# Patient Record
(demographics unavailable — no encounter records)

---

## 2024-12-10 NOTE — REASON FOR VISIT
[Consultation] : a consultation visit [Patient] : patient [Mother] : mother [Other: ______] : provided by RALPH [Medical Records] : medical records [Interpreters_IDNumber] : [id number] [Interpreters_FullName] : Tania [FreeTextEntry3] : Mandarin

## 2024-12-10 NOTE — REVIEW OF SYSTEMS
[Negative] : Skin [Fever] : no fever [Oral Ulcer] : no oral ulcer [Asthma] : no asthma [Pneumonia] : no pneumonia [Murmur] : no murmur [Joint Pain] : no joint pain [History of UTI] : no history of urinary tract infection [Rash] : no rash [FreeTextEntry3] : no inflammation/pain  [FreeTextEntry9] : no back/leg pain/tingling/numbness/weakness  [FreeTextEntry8] : menarche 5/2024, regular menses 5 maxis/d x 4d at end of each month; no current urinary issues  [de-identified] : no difficulty ambulating

## 2024-12-10 NOTE — REVIEW OF SYSTEMS
[Negative] : Skin [Fever] : no fever [Oral Ulcer] : no oral ulcer [Asthma] : no asthma [Pneumonia] : no pneumonia [Murmur] : no murmur [Joint Pain] : no joint pain [History of UTI] : no history of urinary tract infection [Rash] : no rash [FreeTextEntry3] : no inflammation/pain  [FreeTextEntry9] : no back/leg pain/tingling/numbness/weakness  [FreeTextEntry8] : menarche 5/2024, regular menses 5 maxis/d x 4d at end of each month; no current urinary issues  [de-identified] : no difficulty ambulating

## 2024-12-10 NOTE — HISTORY OF PRESENT ILLNESS
[de-identified] : Belkis (pronounced "Lori") is an 11 year old F with chronic mild anemia since birth (normal iron profile), and FHx anemia (mother with thalassemia, no transfusion needed; brother with anemia, resolved), who is referred by Dr Archer (PMD) in consultation for a positive stool calprotectin.  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   RECENT PMD LABS: - 8/22/23 Hbg 11.6 (normal per ref range), MCV 74, plt 477; normal Hgb electrophoresis, albumin 5.2 - 10/8/23 Hgb 11.2 (normal per ref range), MCV 77, plt 479 - 8/7/24 Hgb 11.2 (anemic for age), MCV 78, plt 403, retic 2.2%, albumin 4.6, normal iron panel - 8/26/24 positive calpro 445, FOBT neg  Belkis used to have a poor appetite but is now eating better. Gaining weight, per growth curve. There is no report of nausea, reflux, vomiting, or abdominal pain.  Belkis stools once every 1-2 days, [place] 3, sometimes with straining. There is sometimes a small amount of blood.  There is no rectal pain, mucus, steatorrhea, fecal accidents, nocturnal BMs or diarrhea.  He is not gassy nor distended.  Has not tried laxatives.   Belkis denies having fever, eye pain, mouth sores, joint pain or rash.  There is no family history of IBD or autoimmune diseases.

## 2024-12-10 NOTE — CONSULT LETTER
[Dear  ___] : Dear  [unfilled], [Consult Letter:] : I had the pleasure of evaluating your patient, [unfilled]. [Please see my note below.] : Please see my note below. [Consult Closing:] : Thank you very much for allowing me to participate in the care of this patient.  If you have any questions, please do not hesitate to contact me. [Sincerely,] : Sincerely, [FreeTextEntry3] : Yaritza Evans MD [facility]

## 2024-12-10 NOTE — PHYSICAL EXAM
[Well Developed] : well developed [Well Nourished] : well nourished [NAD] : in no acute distress [Alert and Active] : alert and active [Moist & Pink Mucous Membranes] : moist and pink mucous membranes [Normal Oropharynx] : the oropharynx was normal [CTAB] : lungs clear to auscultation bilaterally [Regular Rate and Rhythm] : regular rate and rhythm [Normal S1, S2] : normal S1 and S2 [Soft] : soft  [Normal Bowel Sounds] : normal bowel sounds [No HSM] : no hepatosplenomegaly appreciated [No Back Lesion] : no back lesion [Normal Position] : normal position [Rectal Exam Deferred] : rectal exam was deferred [Verbal] : verbal [Well-Perfused] : well-perfused [Interactive] : interactive [Timmy Stage ___] : Timmy stage [unfilled] [icteric] : anicteric [Oral Ulcers] : no oral ulcers [Respiratory Distress] : no respiratory distress  [Distended] : non distended [Tender] : non tender [Tags] : no skin tags  [Fissure] : no anal fissures  [Hemorrhoids] : no hemorrhoids [Joint Swelling] : no joint swelling [Cyanosis] : no cyanosis [Rash] : no rash [Jaundice] : no jaundice

## 2024-12-10 NOTE — SOCIAL HISTORY
[Parent(s)] : parent(s) [Grandparent(s)] : grandparent(s) [Brother] : brother [Grade:  _____] : Grade: [unfilled] [de-identified] : uncle

## 2024-12-10 NOTE — REASON FOR VISIT
[Consultation] : a consultation visit [Patient] : patient [Mother] : mother [Other: ______] : provided by RALPH [Medical Records] : medical records [Interpreters_IDNumber] : [id number] [Interpreters_FullName] : Tania [FreeTextEntry3] : Mandarin

## 2024-12-10 NOTE — HISTORY OF PRESENT ILLNESS
[de-identified] : Belkis (pronounced "Lori") is an 11 year old F with chronic mild anemia since birth (normal iron profile), and FHx anemia (mother with thalassemia, no transfusion needed; brother with anemia, resolved), who is referred by Dr Archer (PMD) in consultation for a positive stool calprotectin.  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   RECENT PMD LABS: - 8/22/23 Hbg 11.6 (normal per ref range), MCV 74, plt 477; normal Hgb electrophoresis, albumin 5.2 - 10/8/23 Hgb 11.2 (normal per ref range), MCV 77, plt 479 - 8/7/24 Hgb 11.2 (anemic for age), MCV 78, plt 403, retic 2.2%, albumin 4.6, normal iron panel - 8/26/24 positive calpro 445, FOBT neg  Belkis used to have a poor appetite but is now eating better. Gaining weight, per growth curve. There is no report of nausea, reflux, vomiting, or abdominal pain.  Belkis stools once every 1-2 days, [place] 3, sometimes with straining. There is sometimes a small amount of blood.  There is no rectal pain, mucus, steatorrhea, fecal accidents, nocturnal BMs or diarrhea.  He is not gassy nor distended.  Has not tried laxatives.   Belkis denies having fever, eye pain, mouth sores, joint pain or rash.  There is no family history of IBD or autoimmune diseases.

## 2024-12-10 NOTE — CONSULT LETTER
[Dear  ___] : Dear  [unfilled], [Consult Letter:] : I had the pleasure of evaluating your patient, [unfilled]. [Please see my note below.] : Please see my note below. [Consult Closing:] : Thank you very much for allowing me to participate in the care of this patient.  If you have any questions, please do not hesitate to contact me. [Sincerely,] : Sincerely, [FreeTextEntry3] : Yaritza Evans MD [facility]

## 2024-12-10 NOTE — SOCIAL HISTORY
[Parent(s)] : parent(s) [Grandparent(s)] : grandparent(s) [Brother] : brother [Grade:  _____] : Grade: [unfilled] [de-identified] : uncle

## 2024-12-10 NOTE — ASSESSMENT
[Educated Patient & Family about Diagnosis] : educated the patient and family about the diagnosis [Discussed with Family to Call in ____ week(s) for Test Results] : discussed with family to call in [unfilled] week(s) to obtain test results and with update on child's condition.  Family should call sooner if clinically indicated. [FreeTextEntry1] : ASSESSMENT: Chronic intermittent microcytic anemia with normal iron panel; FHx anemia - currently mildly anemic with an elevated calprotectin, concerning for IBD.  Neg FOBT. No symptoms of IBD.  PLAN: -  Labs. -  Repeat calpro and FOBT. -  Discussed potential of upper and lower endoscopy (including risks vs benefits) if calpro still positive or anemia persists. However, mother prefers not to scope if calpro neg even if anemia persists. Urine cup given, to bring sample on day of procedure.  -  Will discuss follow up after review results. Family to call if problems.  All questions answered.   ADDENDUM: Dec 10, 2024 - Hgb 10.9, MCV 80, plt 427.  Normal ESR, CRP, albumin 4.6.  Requested [place] [facility] to call family with result and remind them to send stool studies.

## 2024-12-10 NOTE — FAMILY HISTORY
[Celiac Disease] : no celiac disease [Inflammatory Bowel Disease] : no inflammatory bowel disease [de-identified] : no autoimmune diseases

## 2024-12-10 NOTE — FAMILY HISTORY
[Celiac Disease] : no celiac disease [Inflammatory Bowel Disease] : no inflammatory bowel disease [de-identified] : no autoimmune diseases

## 2025-03-22 NOTE — REVIEW OF SYSTEMS
[Negative] : Skin [Oral Ulcer] : no oral ulcer [Asthma] : no asthma [Pneumonia] : no pneumonia [Murmur] : no murmur [Joint Pain] : no joint pain [History of UTI] : no history of urinary tract infection [Rash] : no rash [FreeTextEntry2] : sick last week x 1d [FreeTextEntry3] : no inflammation/pain  [FreeTextEntry9] : no back/leg pain/tingling/numbness/weakness  [FreeTextEntry8] : menarche 5/2024, regular menses 5 maxis/d x 4d at end of each month; no current urinary issues  [de-identified] : no difficulty ambulating

## 2025-03-22 NOTE — REVIEW OF SYSTEMS
[Negative] : Skin [Oral Ulcer] : no oral ulcer [Asthma] : no asthma [Pneumonia] : no pneumonia [Murmur] : no murmur [Joint Pain] : no joint pain [History of UTI] : no history of urinary tract infection [Rash] : no rash [FreeTextEntry2] : sick last week x 1d [FreeTextEntry3] : no inflammation/pain  [FreeTextEntry9] : no back/leg pain/tingling/numbness/weakness  [FreeTextEntry8] : menarche 5/2024, regular menses 5 maxis/d x 4d at end of each month; no current urinary issues  [de-identified] : no difficulty ambulating

## 2025-03-22 NOTE — SOCIAL HISTORY
[Parent(s)] : parent(s) [Grandparent(s)] : grandparent(s) [Brother] : brother [Grade:  _____] : Grade: [unfilled] [de-identified] : uncle

## 2025-03-22 NOTE — REASON FOR VISIT
[Consultation Follow Up] : a consultation follow up  [Patient] : patient [Mother] : mother [Other: ______] : provided by RALPH [Time Spent: ____ minutes] : Total time spent using  services: [unfilled] minutes. The patient's primary language is not English thus required  services. [Interpreters_IDNumber] : [id number] [Interpreters_FullName] : Lizette [FreeTextEntry3] : Mandarin

## 2025-03-22 NOTE — CONSULT LETTER
[Dear  ___] : Dear  [unfilled], [Consult Letter:] : I had the pleasure of evaluating your patient, [unfilled]. [Please see my note below.] : Please see my note below. [Consult Closing:] : Thank you very much for allowing me to participate in the care of this patient.  If you have any questions, please do not hesitate to contact me. [Sincerely,] : Sincerely, [FreeTextEntry3] : Yaritza Evans MD [facility]

## 2025-03-22 NOTE — SOCIAL HISTORY
[Parent(s)] : parent(s) [Grandparent(s)] : grandparent(s) [Brother] : brother [Grade:  _____] : Grade: [unfilled] [de-identified] : uncle

## 2025-03-22 NOTE — ASSESSMENT
[Discussed with Family to Call in ____ week(s) for Test Results] : discussed with family to call in [unfilled] week(s) to obtain test results and with update on child's condition.  Family should call sooner if clinically indicated. [FreeTextEntry1] : ASSESSMENT: 1. Chronic intermittent microcytic anemia with normal iron panel; FHx anemia - elevated calprotectin trended down from 445 to 72. Neg FOBT x2. Normal EGD/colon endoscopically and histologically but pt had a somewhat poor prep.   2. Mild weight loss since last visit 3. Constipation   PLAN: -  Repeat calpro to correlate with recent colonoscopy.  -  Family deferred repeat colonoscopy and SB eval. -  Encourage 3 meals a day. -  Pediasure prn (sample given, family to call for script if pt drinks it consistently).  May mix with whole milk 1:1 if Pediasure too sweet. -  Start Miralax 1 cap daily (e-scribed).  Titrate dose as needed for a daily soft BM without straining or pain (max 1 cap).  Decrease dose if have diarrhea.  -  Follow up in GI clinic in 3 months.   Family to call if problems.  All questions answered.

## 2025-03-22 NOTE — FAMILY HISTORY
[Inflammatory Bowel Disease] : no inflammatory bowel disease [de-identified] : no autoimmune diseases

## 2025-03-22 NOTE — SOCIAL HISTORY
[Parent(s)] : parent(s) [Grandparent(s)] : grandparent(s) [Brother] : brother [Grade:  _____] : Grade: [unfilled] [de-identified] : uncle

## 2025-03-22 NOTE — FAMILY HISTORY
[Inflammatory Bowel Disease] : no inflammatory bowel disease [de-identified] : no autoimmune diseases

## 2025-03-22 NOTE — HISTORY OF PRESENT ILLNESS
[de-identified] : Belkis (pronounced "Lori") is a 12 year old F with chronic mild anemia since birth (normal iron profile), and FHx anemia (mother with thalassemia, no transfusion needed; brother with anemia, resolved), who is here for follow up of a positive stool calprotectin.  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   RECENT PMD LABS: - 8/22/23 Hbg 11.6 (normal per ref range), MCV 74, plt 477; normal Hgb electrophoresis, albumin 5.2 - 10/8/23 Hgb 11.2 (normal per ref range), MCV 77, plt 479 - 8/7/24 Hgb 11.2 (anemic for age), MCV 78, plt 403, retic 2.2%, albumin 4.6, normal iron panel - 8/26/24 positive calpro 445, FOBT neg  GI WORKUP: 12/2024 - Hgb 10.9, MCV 80, plt 427.  Normal ESR, CRP, albumin 4.6.   1/2025 - calpro 72.  FOBT neg. 3/11/25 - Normal EGD/colon endoscopically and histologically. Pt had a somewhat poor prep. Labs drawn during scope: CBC without anemia (Hgb 11.7). Normal albumin and CRP.    INTERIM HX: Belkis had a normal EGD/colon but had a poor prep,  Repeat CBC without anemia.  She has lost 1.2 kg.  Skips breakfast at home but eats breakfast at school. She does not like school lunch so eats lunch when she gets home.  Also eats dinner.   The portions are larger than before (hamburger).  There is no report of nausea, reflux, vomiting, or abdominal pain.  Belkis denies having fever, eye pain, mouth sores, joint pain or rash.     Belkis stools once every day, [place] 1, sometimes with rectal pain.  There is no straining, blood, mucus, steatorrhea, fecal accidents, nocturnal BMs or diarrhea.  She is not gassy nor distended.  Has not tried laxatives.

## 2025-03-22 NOTE — PHYSICAL EXAM
[Well Developed] : well developed [Well Nourished] : well nourished [NAD] : in no acute distress [Alert and Active] : alert and active [Moist & Pink Mucous Membranes] : moist and pink mucous membranes [Normal Oropharynx] : the oropharynx was normal [CTAB] : lungs clear to auscultation bilaterally [Regular Rate and Rhythm] : regular rate and rhythm [Normal S1, S2] : normal S1 and S2 [Soft] : soft  [Normal Bowel Sounds] : normal bowel sounds [No HSM] : no hepatosplenomegaly appreciated [No Back Lesion] : no back lesion [Normal Position] : normal position [Rectal Exam Deferred] : rectal exam was deferred [Timmy Stage ___] : Timmy stage [unfilled] [Verbal] : verbal [Well-Perfused] : well-perfused [Interactive] : interactive [Tags] : no skin tags  [Fissure] : no anal fissures  [Hemorrhoids] : no hemorrhoids [Joint Swelling] : no joint swelling [Rash] : no rash [icteric] : anicteric [Oral Ulcers] : no oral ulcers [Respiratory Distress] : no respiratory distress  [Distended] : non distended [Tender] : non tender [Cyanosis] : no cyanosis [Jaundice] : no jaundice [de-identified] : on prior exam

## 2025-03-22 NOTE — PHYSICAL EXAM
[Well Developed] : well developed [Well Nourished] : well nourished [NAD] : in no acute distress [Alert and Active] : alert and active [Moist & Pink Mucous Membranes] : moist and pink mucous membranes [Normal Oropharynx] : the oropharynx was normal [CTAB] : lungs clear to auscultation bilaterally [Regular Rate and Rhythm] : regular rate and rhythm [Normal S1, S2] : normal S1 and S2 [Soft] : soft  [Normal Bowel Sounds] : normal bowel sounds [No HSM] : no hepatosplenomegaly appreciated [No Back Lesion] : no back lesion [Normal Position] : normal position [Rectal Exam Deferred] : rectal exam was deferred [Timmy Stage ___] : Timmy stage [unfilled] [Verbal] : verbal [Well-Perfused] : well-perfused [Interactive] : interactive [Tags] : no skin tags  [Fissure] : no anal fissures  [Hemorrhoids] : no hemorrhoids [Joint Swelling] : no joint swelling [Rash] : no rash [icteric] : anicteric [Oral Ulcers] : no oral ulcers [Respiratory Distress] : no respiratory distress  [Distended] : non distended [Tender] : non tender [Cyanosis] : no cyanosis [Jaundice] : no jaundice [de-identified] : on prior exam

## 2025-03-22 NOTE — FAMILY HISTORY
[Inflammatory Bowel Disease] : no inflammatory bowel disease [de-identified] : no autoimmune diseases

## 2025-03-22 NOTE — PHYSICAL EXAM
[Well Developed] : well developed [Well Nourished] : well nourished [NAD] : in no acute distress [Alert and Active] : alert and active [Moist & Pink Mucous Membranes] : moist and pink mucous membranes [Normal Oropharynx] : the oropharynx was normal [CTAB] : lungs clear to auscultation bilaterally [Regular Rate and Rhythm] : regular rate and rhythm [Normal S1, S2] : normal S1 and S2 [Soft] : soft  [Normal Bowel Sounds] : normal bowel sounds [No HSM] : no hepatosplenomegaly appreciated [No Back Lesion] : no back lesion [Normal Position] : normal position [Rectal Exam Deferred] : rectal exam was deferred [Timmy Stage ___] : Timmy stage [unfilled] [Verbal] : verbal [Well-Perfused] : well-perfused [Interactive] : interactive [Tags] : no skin tags  [Fissure] : no anal fissures  [Hemorrhoids] : no hemorrhoids [Joint Swelling] : no joint swelling [Rash] : no rash [icteric] : anicteric [Oral Ulcers] : no oral ulcers [Respiratory Distress] : no respiratory distress  [Distended] : non distended [Tender] : non tender [Cyanosis] : no cyanosis [Jaundice] : no jaundice [de-identified] : on prior exam

## 2025-03-22 NOTE — REASON FOR VISIT
[Consultation Follow Up] : a consultation follow up  [Patient] : patient [Mother] : mother [Other: ______] : provided by RALPH [Time Spent: ____ minutes] : Total time spent using  services: [unfilled] minutes. The patient's primary language is not English thus required  services. [Interpreters_IDNumber] : [id number] [Interpreters_FullName] : Lizette [FreeTextEntry3] : Mandarin

## 2025-03-22 NOTE — CONSULT LETTER
[Dear  ___] : Dear  [unfilled], [Consult Letter:] : I had the pleasure of evaluating your patient, [unfilled]. [Please see my note below.] : Please see my note below. [Consult Closing:] : Thank you very much for allowing me to participate in the care of this patient.  If you have any questions, please do not hesitate to contact me. [Sincerely,] : Sincerely, [FreeTextEntry3] : Yaritza Evans MD [facility]

## 2025-03-22 NOTE — HISTORY OF PRESENT ILLNESS
[de-identified] : Belkis (pronounced "Lori") is a 12 year old F with chronic mild anemia since birth (normal iron profile), and FHx anemia (mother with thalassemia, no transfusion needed; brother with anemia, resolved), who is here for follow up of a positive stool calprotectin.  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   RECENT PMD LABS: - 8/22/23 Hbg 11.6 (normal per ref range), MCV 74, plt 477; normal Hgb electrophoresis, albumin 5.2 - 10/8/23 Hgb 11.2 (normal per ref range), MCV 77, plt 479 - 8/7/24 Hgb 11.2 (anemic for age), MCV 78, plt 403, retic 2.2%, albumin 4.6, normal iron panel - 8/26/24 positive calpro 445, FOBT neg  GI WORKUP: 12/2024 - Hgb 10.9, MCV 80, plt 427.  Normal ESR, CRP, albumin 4.6.   1/2025 - calpro 72.  FOBT neg. 3/11/25 - Normal EGD/colon endoscopically and histologically. Pt had a somewhat poor prep. Labs drawn during scope: CBC without anemia (Hgb 11.7). Normal albumin and CRP.    INTERIM HX: Belkis had a normal EGD/colon but had a poor prep,  Repeat CBC without anemia.  She has lost 1.2 kg.  Skips breakfast at home but eats breakfast at school. She does not like school lunch so eats lunch when she gets home.  Also eats dinner.   The portions are larger than before (hamburger).  There is no report of nausea, reflux, vomiting, or abdominal pain.  Belkis denies having fever, eye pain, mouth sores, joint pain or rash.     Belkis stools once every day, [place] 1, sometimes with rectal pain.  There is no straining, blood, mucus, steatorrhea, fecal accidents, nocturnal BMs or diarrhea.  She is not gassy nor distended.  Has not tried laxatives.

## 2025-03-22 NOTE — REVIEW OF SYSTEMS
[Negative] : Skin [Oral Ulcer] : no oral ulcer [Asthma] : no asthma [Pneumonia] : no pneumonia [Murmur] : no murmur [Joint Pain] : no joint pain [History of UTI] : no history of urinary tract infection [Rash] : no rash [FreeTextEntry2] : sick last week x 1d [FreeTextEntry3] : no inflammation/pain  [FreeTextEntry9] : no back/leg pain/tingling/numbness/weakness  [FreeTextEntry8] : menarche 5/2024, regular menses 5 maxis/d x 4d at end of each month; no current urinary issues  [de-identified] : no difficulty ambulating

## 2025-03-22 NOTE — REASON FOR VISIT
[Consultation Follow Up] : a consultation follow up  [Patient] : patient [Mother] : mother [Other: ______] : provided by RALPH [Time Spent: ____ minutes] : Total time spent using  services: [unfilled] minutes. The patient's primary language is not English thus required  services. [Interpreters_IDNumber] : [id number] [Interpreters_FullName] : Lizette [FreeTextEntry3] : Mandarin

## 2025-03-22 NOTE — CONSULT LETTER
[Dear  ___] : Dear  [unfilled], [Consult Letter:] : I had the pleasure of evaluating your patient, [unfilled]. [Please see my note below.] : Please see my note below. [Consult Closing:] : Thank you very much for allowing me to participate in the care of this patient.  If you have any questions, please do not hesitate to contact me. [Sincerely,] : Sincerely, [FreeTextEntry3] : Yaritza Evans MD [facility]

## 2025-03-22 NOTE — HISTORY OF PRESENT ILLNESS
[de-identified] : Belkis (pronounced "Lori") is a 12 year old F with chronic mild anemia since birth (normal iron profile), and FHx anemia (mother with thalassemia, no transfusion needed; brother with anemia, resolved), who is here for follow up of a positive stool calprotectin.  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   RECENT PMD LABS: - 8/22/23 Hbg 11.6 (normal per ref range), MCV 74, plt 477; normal Hgb electrophoresis, albumin 5.2 - 10/8/23 Hgb 11.2 (normal per ref range), MCV 77, plt 479 - 8/7/24 Hgb 11.2 (anemic for age), MCV 78, plt 403, retic 2.2%, albumin 4.6, normal iron panel - 8/26/24 positive calpro 445, FOBT neg  GI WORKUP: 12/2024 - Hgb 10.9, MCV 80, plt 427.  Normal ESR, CRP, albumin 4.6.   1/2025 - calpro 72.  FOBT neg. 3/11/25 - Normal EGD/colon endoscopically and histologically. Pt had a somewhat poor prep. Labs drawn during scope: CBC without anemia (Hgb 11.7). Normal albumin and CRP.    INTERIM HX: Belkis had a normal EGD/colon but had a poor prep,  Repeat CBC without anemia.  She has lost 1.2 kg.  Skips breakfast at home but eats breakfast at school. She does not like school lunch so eats lunch when she gets home.  Also eats dinner.   The portions are larger than before (hamburger).  There is no report of nausea, reflux, vomiting, or abdominal pain.  Belkis denies having fever, eye pain, mouth sores, joint pain or rash.     Belkis stools once every day, [place] 1, sometimes with rectal pain.  There is no straining, blood, mucus, steatorrhea, fecal accidents, nocturnal BMs or diarrhea.  She is not gassy nor distended.  Has not tried laxatives.

## 2025-07-02 NOTE — REASON FOR VISIT
[Consultation Follow Up] : a consultation follow up  [Patient] : patient [Mother] : mother [Language Line ] : provided by Language Line   [Time Spent: ____ minutes] : Total time spent using  services: [unfilled] minutes. The patient's primary language is not English thus required  services. [Interpreters_FullName] : Aimee [Interpreters_IDNumber] : [id number] [FreeTextEntry3] : Mandarin

## 2025-07-02 NOTE — ASSESSMENT
[Discussed with Family to Call in ____ week(s) for Test Results] : discussed with family to call in [unfilled] week(s) to obtain test results and with update on child's condition.  Family should call sooner if clinically indicated. [FreeTextEntry1] : ASSESSMENT AND PLAN: 1. Chronic intermittent microcytic anemia with normal iron panel; FHx anemia - elevated calprotectin but most   recent values trending down from 398 to 290 (while acutely sick). Neg FOBT x2. Normal EGD/colon endoscopically and histologically but pt had a somewhat poor prep.   -  Reviewed calpro result with family.  -  Family again declined repeat colonoscopy and SB eval.  Will repeat calpro.  If still positive, family agreed to proceed with MRE.  2. Mild weight loss since last visit - now gaining weight well  3. Constipation - resolved, no change in BMs on or off Miralax -  Discontinue Miralax.   Follow up in GI clinic in 3 months.   Family to call if problems.  All questions answered.

## 2025-07-02 NOTE — HISTORY OF PRESENT ILLNESS
[de-identified] : Belkis (pronounced "Lori") is a 12 year old F with chronic mild anemia since birth (normal iron profile), and FHx anemia (mother with thalassemia, no transfusion needed; brother with anemia, resolved), who is here for follow up of a positive stool calprotectin.  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   RECENT PMD LABS: - 8/22/23 Hbg 11.6 (normal per ref range), MCV 74, plt 477; normal Hgb electrophoresis, albumin 5.2 - 10/8/23 Hgb 11.2 (normal per ref range), MCV 77, plt 479 - 8/7/24 Hgb 11.2 (anemic for age), MCV 78, plt 403, retic 2.2%, albumin 4.6, normal iron panel - 8/26/24 positive calpro 445, FOBT neg  GI WORKUP: 12/2024 - Hgb 10.9, MCV 80, plt 427.  Normal ESR, CRP, albumin 4.6.   1/2025 - calpro 72.  FOBT neg. 3/11/25 - Normal EGD/colon endoscopically and histologically. Pt had a somewhat poor prep. Labs drawn during scope: CBC without anemia (Hgb 11.7). Normal albumin and CRP.   4/2025 calpro 398 6/2025 calpro 290  INTERIM HX: Since the last visit on 3/19, calpro was elevated at 398, and is now 290.    She was sick last week with URI and foul-smelling stool but did not have diarrhea.  Belkis denies having fever, eye pain, mouth sores, joint pain or rash.     Belkis has gained ~4 kg (from 33% to 46%) and grown 0.9 cm (from 43% to 38%).  Appetite is good.  Drinks small amount of Lactaid milk; she refused Pediasure.  There is no report of nausea, reflux, vomiting, or abdominal pain.    On Miralax 1 cap some days.  On days with or without Miralax, Belkis stools the same - once every day, [place] 4, without straining or rectal pain.  There is no melena, blood, mucus, steatorrhea, fecal accidents, nocturnal BMs or diarrhea.  She is not excessively gassy nor distended.

## 2025-07-02 NOTE — REVIEW OF SYSTEMS
[Negative] : Constitutional [Fever] : no fever [Oral Ulcer] : no oral ulcer [Asthma] : no asthma [Pneumonia] : no pneumonia [Murmur] : no murmur [Joint Pain] : no joint pain [History of UTI] : no history of urinary tract infection [Rash] : no rash [FreeTextEntry3] : no inflammation/pain  [FreeTextEntry8] : menarche 5/2024, regular menses 5 maxis/d x 4d at end of each month

## 2025-07-02 NOTE — FAMILY HISTORY
[Inflammatory Bowel Disease] : no inflammatory bowel disease [de-identified] : no autoimmune diseases

## 2025-07-02 NOTE — SOCIAL HISTORY
[Parent(s)] : parent(s) [Grandparent(s)] : grandparent(s) [Brother] : brother [Grade:  _____] : Grade: [unfilled] [de-identified] : uncle

## 2025-07-02 NOTE — PHYSICAL EXAM
[Well Developed] : well developed [Well Nourished] : well nourished [NAD] : in no acute distress [Alert and Active] : alert and active [Moist & Pink Mucous Membranes] : moist and pink mucous membranes [Normal Oropharynx] : the oropharynx was normal [CTAB] : lungs clear to auscultation bilaterally [Regular Rate and Rhythm] : regular rate and rhythm [Normal S1, S2] : normal S1 and S2 [Soft] : soft  [Normal Bowel Sounds] : normal bowel sounds [No HSM] : no hepatosplenomegaly appreciated [Timmy Stage ___] : Timmy stage [unfilled] [Verbal] : verbal [Interactive] : interactive [icteric] : anicteric [Oral Ulcers] : no oral ulcers [Respiratory Distress] : no respiratory distress  [Distended] : non distended [Tender] : non tender [Cyanosis] : no cyanosis [Jaundice] : no jaundice [de-identified] : on prior exam